# Patient Record
Sex: FEMALE | Race: BLACK OR AFRICAN AMERICAN | HISPANIC OR LATINO | URBAN - METROPOLITAN AREA
[De-identification: names, ages, dates, MRNs, and addresses within clinical notes are randomized per-mention and may not be internally consistent; named-entity substitution may affect disease eponyms.]

---

## 2019-04-20 ENCOUNTER — EMERGENCY (EMERGENCY)
Facility: HOSPITAL | Age: 24
LOS: 1 days | Discharge: HOME | End: 2019-04-20
Attending: STUDENT IN AN ORGANIZED HEALTH CARE EDUCATION/TRAINING PROGRAM
Payer: MEDICAID

## 2019-04-20 VITALS
OXYGEN SATURATION: 97 % | DIASTOLIC BLOOD PRESSURE: 83 MMHG | SYSTOLIC BLOOD PRESSURE: 136 MMHG | TEMPERATURE: 99 F | HEART RATE: 92 BPM

## 2019-04-20 VITALS — WEIGHT: 139.99 LBS | HEIGHT: 64 IN

## 2019-04-20 PROCEDURE — 99283 EMERGENCY DEPT VISIT LOW MDM: CPT

## 2019-04-20 RX ORDER — KETOROLAC TROMETHAMINE 30 MG/ML
30 SYRINGE (ML) INJECTION ONCE
Qty: 0 | Refills: 0 | Status: DISCONTINUED | OUTPATIENT
Start: 2019-04-20 | End: 2019-04-20

## 2019-04-20 RX ORDER — IBUPROFEN 200 MG
1 TABLET ORAL
Qty: 15 | Refills: 0 | OUTPATIENT
Start: 2019-04-20 | End: 2019-04-24

## 2019-04-20 RX ADMIN — Medication 30 MILLIGRAM(S): at 18:10

## 2019-04-20 RX ADMIN — Medication 300 MILLIGRAM(S): at 18:10

## 2019-04-20 NOTE — ED STATDOCS - OBJECTIVE STATEMENT
23 y/o F presents to ED with 2 weeks of progressively worsening left lower tooth pain and facial pain.  Denies fever, chills, sore throat or difficulty swallowing.  Has to wait 2 week for insurance then can see dentist.  taking tylenol or ibuprofen for pain which is temporarily effective.

## 2019-04-20 NOTE — ED ADULT TRIAGE NOTE - CHIEF COMPLAINT QUOTE
Patient arrived ambulatory to ED, awake alert, and oriented times 3,  breathing unlabored.  Patient complaining of pain to left lower tooth.  Patient states pain started 2 weeks ago

## 2019-04-20 NOTE — ED STATDOCS - CLINICAL SUMMARY MEDICAL DECISION MAKING FREE TEXT BOX
Will treat with clindamycin 300mg TID x 7 days and ibuprofen for pain, refer to [facility].

## 2019-04-20 NOTE — ED ADULT NURSE NOTE - NSIMPLEMENTINTERV_GEN_ALL_ED
Implemented All Universal Safety Interventions:  [place] to call system. Call bell, personal items and telephone within reach. Instruct patient to call for assistance. Room bathroom lighting operational. Non-slip footwear when patient is off stretcher. Physically safe environment: no spills, clutter or unnecessary equipment. Stretcher in lowest position, wheels locked, appropriate side rails in place.

## 2023-12-13 ENCOUNTER — OFFICE (OUTPATIENT)
Dept: URBAN - METROPOLITAN AREA CLINIC 116 | Facility: CLINIC | Age: 28
Setting detail: OPHTHALMOLOGY
End: 2023-12-13
Payer: COMMERCIAL

## 2023-12-13 DIAGNOSIS — Z01.00: ICD-10-CM

## 2023-12-13 PROBLEM — H52.13 MYOPIA; BOTH EYES: Status: ACTIVE | Noted: 2023-12-13

## 2023-12-13 PROCEDURE — 92004 COMPRE OPH EXAM NEW PT 1/>: CPT | Performed by: OPTOMETRIST

## 2023-12-13 ASSESSMENT — REFRACTION_MANIFEST
OD_SPHERE: -1.00
OS_SPHERE: -0.75
OD_CYLINDER: SPH
OS_VA1: 20/20
OD_VA1: 20/20
OS_CYLINDER: SPH

## 2023-12-13 ASSESSMENT — CONFRONTATIONAL VISUAL FIELD TEST (CVF)
OD_FINDINGS: FULL
OS_FINDINGS: FULL

## 2023-12-13 ASSESSMENT — REFRACTION_AUTOREFRACTION
OS_SPHERE: -0.75
OD_SPHERE: -1.00

## 2025-05-11 NOTE — ED STATDOCS - PMH
Report given to YUMI Treviño at [facility].    No pertinent past medical history <<----- Click to add NO pertinent Past Medical History